# Patient Record
Sex: FEMALE | Race: WHITE | HISPANIC OR LATINO | Employment: OTHER | URBAN - METROPOLITAN AREA
[De-identification: names, ages, dates, MRNs, and addresses within clinical notes are randomized per-mention and may not be internally consistent; named-entity substitution may affect disease eponyms.]

---

## 2021-09-01 ENCOUNTER — OFFICE VISIT (OUTPATIENT)
Dept: URGENT CARE | Facility: PHYSICIAN GROUP | Age: 81
End: 2021-09-01

## 2021-09-01 VITALS
TEMPERATURE: 97.8 F | SYSTOLIC BLOOD PRESSURE: 140 MMHG | HEART RATE: 79 BPM | WEIGHT: 120 LBS | OXYGEN SATURATION: 100 % | HEIGHT: 59 IN | DIASTOLIC BLOOD PRESSURE: 94 MMHG | BODY MASS INDEX: 24.19 KG/M2 | RESPIRATION RATE: 18 BRPM

## 2021-09-01 DIAGNOSIS — R03.0 ELEVATED BLOOD PRESSURE READING: ICD-10-CM

## 2021-09-01 PROCEDURE — 99203 OFFICE O/P NEW LOW 30 MIN: CPT | Performed by: PHYSICIAN ASSISTANT

## 2021-09-01 ASSESSMENT — ENCOUNTER SYMPTOMS
WEAKNESS: 0
TINGLING: 0
WEIGHT LOSS: 0
SENSORY CHANGE: 0
DIZZINESS: 0
PALPITATIONS: 0
VOMITING: 0
FLANK PAIN: 0
WHEEZING: 0
HEADACHES: 0
DOUBLE VISION: 0
DIARRHEA: 0
BLURRED VISION: 0
NAUSEA: 0
CHILLS: 0
NECK PAIN: 0
DIAPHORESIS: 0
SORE THROAT: 0
FEVER: 0
COUGH: 0
MYALGIAS: 0
SINUS PAIN: 0
SHORTNESS OF BREATH: 0
ABDOMINAL PAIN: 0

## 2021-09-01 NOTE — PROGRESS NOTES
Subjective:   Destinee Stapleton is a 81 y.o. female who presents for Blood Pressure Problem (pt does not have a PCP, but has been having issues with blood pressure for x2 weeks )      HPI:  This is a very pleasant 81-year-old female presenting to the clinic accompanied by her . Patient is Maltese-speaking. Maltese language line was used for patient interview throughout the visit. Patient states over the last 2 days she has had fluctuations in her blood pressure. She has been taking this with an at home blood pressure cuff. She denies any prior history of hypertension or fluctuations in blood pressure. Currently not taking any medications. Denies any significant past medical history. She states 2 family members are currently in the hospital ill. She has been under lots of stress. She has measured her blood pressure and on one occasion in the last 2 days systolic reading was 200. She denies any associated symptoms at this time. She denies any chest pain, palpitations, shortness of breath, headaches, dizziness or paresthesias in the extremities. States she is still eating and drinking appropriately. States she does not feel ill. No fevers, chills or myalgias. Currently does not have a PCP. Has not had lab work performed in multiple years.    Review of Systems   Constitutional: Negative for chills, diaphoresis, fever, malaise/fatigue and weight loss.   HENT: Negative for congestion, sinus pain and sore throat.    Eyes: Negative for blurred vision and double vision.   Respiratory: Negative for cough, shortness of breath and wheezing.    Cardiovascular: Negative for chest pain, palpitations and leg swelling.   Gastrointestinal: Negative for abdominal pain, diarrhea, nausea and vomiting.   Genitourinary: Negative for dysuria, flank pain and frequency.   Musculoskeletal: Negative for myalgias and neck pain.   Skin: Negative for rash.   Neurological: Negative for dizziness, tingling, sensory change, weakness and  "headaches.       Medications:    • This patient does not have an active medication from one of the medication groupers.    Allergies: Patient has no allergy information on record.    Problem List: Destinee Stapleton does not have a problem list on file.    Surgical History:  No past surgical history on file.    Past Social Hx: Destinee Stapleton  reports that she has never smoked. She has never used smokeless tobacco.     Past Family Hx:  Destinee Stapleton family history is not on file.     Problem list, medications, and allergies reviewed by myself today in Epic.     Objective:     /94   Pulse 79   Temp 36.6 °C (97.8 °F) (Temporal)   Resp 18   Ht 1.499 m (4' 11\")   Wt 54.4 kg (120 lb)   SpO2 100%   BMI 24.24 kg/m²     Physical Exam  Constitutional:       General: She is not in acute distress.     Appearance: Normal appearance. She is not ill-appearing, toxic-appearing or diaphoretic.   HENT:      Head: Normocephalic and atraumatic.      Right Ear: Tympanic membrane, ear canal and external ear normal.      Left Ear: Tympanic membrane, ear canal and external ear normal.      Nose: Nose normal. No congestion or rhinorrhea.      Mouth/Throat:      Mouth: Mucous membranes are moist.      Pharynx: No oropharyngeal exudate or posterior oropharyngeal erythema.   Eyes:      Conjunctiva/sclera: Conjunctivae normal.   Cardiovascular:      Rate and Rhythm: Normal rate and regular rhythm.      Pulses: Normal pulses.      Heart sounds: Normal heart sounds. No murmur heard.   No friction rub. No gallop.    Pulmonary:      Effort: Pulmonary effort is normal.      Breath sounds: Normal breath sounds. No wheezing, rhonchi or rales.   Abdominal:      Palpations: Abdomen is soft.      Tenderness: There is no abdominal tenderness.   Musculoskeletal:         General: Normal range of motion.      Cervical back: Normal range of motion. No tenderness. No muscular tenderness.   Lymphadenopathy:      Cervical: No " cervical adenopathy.   Skin:     General: Skin is warm and dry.      Capillary Refill: Capillary refill takes less than 2 seconds.   Neurological:      General: No focal deficit present.      Mental Status: She is alert and oriented to person, place, and time. Mental status is at baseline.   Psychiatric:         Mood and Affect: Mood normal.         Thought Content: Thought content normal.            Assessment/Plan:     Comments/MDM:     • The patient appears well in clinic today. Not currently experiencing any symptoms. Vital signs are stable and within normal limits. Blood pressure within normal range. No focal neurological deficits. Heart regular rate and rhythm. Patient is dealing with life stressors at home currently. Has 2 family members in the hospital. Likely stress is contributing to her symptoms. I am going to place a follow-up for the patient to establish care with a PCP to monitor blood pressure going forward. Discussed red flag symptoms that would warrant emergent follow-up in the ED. Patient verbalized understanding of this.     Diagnosis and associated orders:     1. Elevated blood pressure reading    - AMB REFERRAL TO ESTABLISH WITH [facility] PCP           Differential diagnosis, natural history, supportive care, and indications for immediate follow-up discussed.    Advised the patient to follow-up with the primary care physician for recheck, reevaluation, and consideration of further management.    Please note that this dictation was created using voice recognition software. I have made reasonable attempt to correct obvious errors, but I expect that there are errors of grammar and possibly content that I did not discover before finalizing the note.    This note was electronically signed by JHON Bailey PA-C